# Patient Record
Sex: FEMALE | Race: OTHER | NOT HISPANIC OR LATINO
[De-identification: names, ages, dates, MRNs, and addresses within clinical notes are randomized per-mention and may not be internally consistent; named-entity substitution may affect disease eponyms.]

---

## 2023-01-30 PROBLEM — Z00.00 ENCOUNTER FOR PREVENTIVE HEALTH EXAMINATION: Status: ACTIVE | Noted: 2023-01-30

## 2023-02-14 ENCOUNTER — RESULT REVIEW (OUTPATIENT)
Age: 63
End: 2023-02-14

## 2023-02-14 ENCOUNTER — OUTPATIENT (OUTPATIENT)
Dept: OUTPATIENT SERVICES | Facility: HOSPITAL | Age: 63
LOS: 1 days | End: 2023-02-14
Payer: COMMERCIAL

## 2023-02-14 ENCOUNTER — APPOINTMENT (OUTPATIENT)
Dept: ORTHOPEDIC SURGERY | Facility: CLINIC | Age: 63
End: 2023-02-14
Payer: MEDICAID

## 2023-02-14 VITALS
HEIGHT: 62 IN | HEART RATE: 70 BPM | SYSTOLIC BLOOD PRESSURE: 147 MMHG | WEIGHT: 170 LBS | BODY MASS INDEX: 31.28 KG/M2 | DIASTOLIC BLOOD PRESSURE: 83 MMHG | OXYGEN SATURATION: 97 %

## 2023-02-14 DIAGNOSIS — Z86.79 PERSONAL HISTORY OF OTHER DISEASES OF THE CIRCULATORY SYSTEM: ICD-10-CM

## 2023-02-14 DIAGNOSIS — Z86.39 PERSONAL HISTORY OF OTHER ENDOCRINE, NUTRITIONAL AND METABOLIC DISEASE: ICD-10-CM

## 2023-02-14 DIAGNOSIS — Z78.9 OTHER SPECIFIED HEALTH STATUS: ICD-10-CM

## 2023-02-14 PROCEDURE — 72170 X-RAY EXAM OF PELVIS: CPT

## 2023-02-14 PROCEDURE — 72170 X-RAY EXAM OF PELVIS: CPT | Mod: 26

## 2023-02-14 PROCEDURE — 20610 DRAIN/INJ JOINT/BURSA W/O US: CPT | Mod: LT

## 2023-02-14 PROCEDURE — 73564 X-RAY EXAM KNEE 4 OR MORE: CPT | Mod: 26,50

## 2023-02-14 PROCEDURE — 99204 OFFICE O/P NEW MOD 45 MIN: CPT | Mod: 25

## 2023-02-14 PROCEDURE — 73564 X-RAY EXAM KNEE 4 OR MORE: CPT

## 2023-02-14 RX ORDER — MELOXICAM 7.5 MG/1
7.5 TABLET ORAL DAILY
Qty: 30 | Refills: 2 | Status: ACTIVE | COMMUNITY
Start: 2023-02-14 | End: 1900-01-01

## 2023-02-14 RX ORDER — METFORMIN HYDROCHLORIDE 625 MG/1
TABLET ORAL
Refills: 0 | Status: ACTIVE | COMMUNITY

## 2023-02-14 RX ORDER — TAMSULOSIN HYDROCHLORIDE 0.4 MG/1
CAPSULE ORAL
Refills: 0 | Status: ACTIVE | COMMUNITY

## 2023-02-14 RX ORDER — OMEPRAZOLE 40 MG/1
CAPSULE, DELAYED RELEASE ORAL
Refills: 0 | Status: ACTIVE | COMMUNITY

## 2023-02-14 RX ORDER — ATORVASTATIN CALCIUM 40 MG/1
40 TABLET, FILM COATED ORAL
Refills: 0 | Status: ACTIVE | COMMUNITY

## 2023-02-14 RX ORDER — LEVOTHYROXINE SODIUM 0.07 MG/1
75 TABLET ORAL
Refills: 0 | Status: ACTIVE | COMMUNITY

## 2023-02-14 NOTE — PHYSICAL EXAM
[de-identified] :  General appearance: well nourished and hydrated, pleasant, alert and oriented x 3, cooperative.  \par HEENT: normocephalic, EOM intact, wearing mask, external auditory canal clear.  \par Cardiovascular: no lower leg edema, no varicosities, dorsalis pedis pulses palpable and symmetric.  \par Lymphatics: no palpable lymphadenopathy, no lymphedema.  \par Neurologic: sensation is normal, no muscle weakness in upper or lower extremities, patella tendon reflexes present and symmetric.  \par Dermatologic: skin moist, warm, no rash.  \par Spine: cervical spine with normal lordosis and painless range of motion, thoracic spine with normal kyphosis and painless range of motion, lumbosacral spine with normal lordosis and painless range of motion.  No tenderness to palpation along midline spine and paraspinal musculature.  Sacroiliac joints nontender bilaterally. Negative SLR and crossed SLR tests bilaterally.\par Gait: bilaterally cautious, no assistive device\par \par Left knee: \par - Focal soft tissue swelling: none\par - Ecchymosis: none\par - Erythema: none\par - Effusion: trace, no Baker's cyst\par - Wounds: none\par - Alignment: mild constitutional varus\par - Tenderness: mild medial joint line TTP\par - ROM: 3-130 with pain on terminal flexion\par - Collateral laxity: none\par - Cruciate laxity: none\par - Popliteal angle (degrees): 20\par - Quad strength: 5/5\par \par Right knee:\par - Focal soft tissue swelling: none\par - Ecchymosis: none\par - Erythema: none\par - Effusion: trace, no Baker's cyst\par - Wounds: none\par - Alignment: mild varus\par - Tenderness: medial and lateral joint line TTP\par - ROM: 0-100, limited by pain and apprehension\par - Collateral laxity: none\par - Cruciate laxity: none\par - Popliteal angle (degrees): 15\par - Quad strength: 5/5 [de-identified] : AP pelvis and 4 views of the bilateral knees (weightbearing AP, weightbearing Quintero, weightbearing lateral, and Sunrise) were interpreted by me and reviewed with the patient.\par \par Location of imaging: [facility] \par Date of exam: 02/14/2023\par \par Pelvic alignment: normal\par \par Right hip -- \par Arthritis: none\par Deformity: none \par Osteonecrosis: none\par \par Left hip -- \par Arthritis: none\par Deformity: none \par Osteonecrosis: none \par \par Right knee -- \par Alignment: mild varus\par Arthritis: tricompartmental OA most pronounced medially, KL 3\par Patellar height: normal\par Patellar tracking: central\par \par Left knee -- \par Alignment: mild varus\par Arthritis: tricompartmental OA most pronounced medially, KL 2-3\par Patellar height: normal\par Patellar tracking: central

## 2023-02-14 NOTE — DISCUSSION/SUMMARY
[de-identified] : 61 y/o female with right greater than left knee osteoarthritis. \par - We will continue with conservative measures at this time including PT, HEP, Tylenol and meloxicam as necessary. She will receive b/l CSI today. We reviewed the risk of post procedure hyperglycemia which she understands. \par - She will f/u in 3 months with no new XR needed. I informed her that if she does develop clinical and radiographic progression of particularly the right knee osteoarthritis over time, then we may discuss total knee arthroplasty in the future, though this is not something that she was interested in discussing today.

## 2023-02-14 NOTE — HISTORY OF PRESENT ILLNESS
[5] : a current pain level of 5/10 [Bending] : worsened by bending [Walking] : worsened by walking [Heat] : relieved by heat [Ice] : relieved by ice [Rest] : relieved by rest [de-identified] : 02/14/2023: 61 y/o Mongolian speaking female presenting with daughter for evaluation of right worse than left knee pain which has been persistent for several years, also with secondary complaint of low back pain and radiating bilateral leg pain to the toes, which is more recent. She notes difficulty with most functional motions including particularly walking up and down stairs. She notes diffuse circumferential knee pain, and subjective stiffness. Treatments thus far have included PT, most recently in October 2022, as well as Aleve and multiple rounds of CSI most recently administered to bilateral knees on January 14, 2022. The relief afforded by the CSI has decreased somewhat over the years. She is not currently participating in any PT. She has no history of any relevant knee surgeries.\par \par PMHx is significant for HTN, HLD, and DM which she states is under good control. She lives with her daughter and her daughter's family in a second story elevator accessible building.  [de-identified] : pt states pain is sharp , achy and burning

## 2023-02-14 NOTE — PROCEDURE
[de-identified] : Procedure: Knee joint injection\par Laterality: bilateral knees\par Indication: Osteoarthritis - discussed with patient\par Skin prep: alcohol and chlorhexidine\par Anesthesia: ethyl chloride spray\par Needle: 20-gauge\par Portal: inferolateral\par Aspiration: none\par Injectate: 2cc of 2% lidocaine, 2cc of 0.5% bupivacaine, and 1cc of 40mg/mL triamcinolone\par Dressing: Band-aid\par Complications: None

## 2023-02-14 NOTE — END OF VISIT
[FreeTextEntry3] : All medical record entries made by the Scribe were at my, Dr. Georgi Schwartz, direction and personally dictated by me on 02/14/2023. I have reviewed the chart and agree that the record accurately reflects my personal performance of the history, physical exam, assessment and plan. I have also personally directed, reviewed, and agreed with the chart.

## 2023-02-14 NOTE — REVIEW OF SYSTEMS
[Joint Pain] : joint pain [Joint Stiffness] : joint stiffness [Joint Swelling] : joint swelling [Chills] : chills [Cough] : cough [Urinary Frequency] : urinary frequency [Urinary Urgency] : urinary urgency [Incontinence] : incontinence [Sleep Disturbances] : ~T sleep disturbances [Muscle Weakness] : muscle weakness [Negative] : Heme/Lymph

## 2023-02-14 NOTE — ADDENDUM
[FreeTextEntry1] : Documented by Shahana Be acting as a scribe for Dr. Georgi Schwartz on 02/14/2023.

## 2023-02-28 ENCOUNTER — APPOINTMENT (OUTPATIENT)
Dept: PHYSICAL MEDICINE AND REHAB | Facility: CLINIC | Age: 63
End: 2023-02-28
Payer: MEDICAID

## 2023-02-28 ENCOUNTER — RESULT REVIEW (OUTPATIENT)
Age: 63
End: 2023-02-28

## 2023-02-28 ENCOUNTER — OUTPATIENT (OUTPATIENT)
Dept: OUTPATIENT SERVICES | Facility: HOSPITAL | Age: 63
LOS: 1 days | End: 2023-02-28
Payer: COMMERCIAL

## 2023-02-28 VITALS — WEIGHT: 170 LBS | OXYGEN SATURATION: 96 % | BODY MASS INDEX: 31.28 KG/M2 | HEIGHT: 62 IN | HEART RATE: 67 BPM

## 2023-02-28 DIAGNOSIS — R20.2 PARESTHESIA OF SKIN: ICD-10-CM

## 2023-02-28 DIAGNOSIS — E03.9 HYPOTHYROIDISM, UNSPECIFIED: ICD-10-CM

## 2023-02-28 PROCEDURE — 99205 OFFICE O/P NEW HI 60 MIN: CPT

## 2023-02-28 PROCEDURE — 72114 X-RAY EXAM L-S SPINE BENDING: CPT | Mod: 26

## 2023-02-28 PROCEDURE — 72220 X-RAY EXAM SACRUM TAILBONE: CPT | Mod: 26

## 2023-02-28 PROCEDURE — 72220 X-RAY EXAM SACRUM TAILBONE: CPT

## 2023-02-28 PROCEDURE — 72114 X-RAY EXAM L-S SPINE BENDING: CPT

## 2023-02-28 RX ORDER — GABAPENTIN 100 MG/1
100 CAPSULE ORAL
Qty: 90 | Refills: 1 | Status: ACTIVE | COMMUNITY
Start: 2023-02-28 | End: 1900-01-01

## 2023-02-28 NOTE — DATA REVIEWED
[Other: ___] : [unfilled] [FreeTextEntry1] : Urine microalbumin 59.60 mg/dL\par Urine creatine 119.61 mg/dL\par Urine microalb/creat 498ug/mg (H)

## 2023-02-28 NOTE — ASSESSMENT
[FreeTextEntry1] :                                                       Assessment/Plan:\par \par ADDISON OROPEZA is a 62 year female with worsening low back pain here for initial consultation.\par \par Sacroiliac Joint Dysfunction, left\par Sprain of the sacroiliac joint region, left > right\par Chronic lumbar radiculopathy, L5, S1\par Spasm of the lumbar paraspinous muscles\par Chronic neuropathic pain, precedes her acute left low back pain\par Paresthesia of leg, B/L\par Gluteus medius tendinopathy, B/L\par Antalgic gait\par \par - Tiers of treatment and management of above diagnosis(es) were discussed with patient\par - Optimal diet, weight, sleep, and lifestyle management to minimize stress and maximize well being counseling provided\par - Imaging reviewed and discussed with patient\par - Reviewed previous encounter notes from 2/14/2023 Dr. HARSHAL Shcwartz (Ortho Surgery Joint Replacement)\par - Patient was advised to start a structured, targeted therapy program 2-3x/wk for 8 wks with goal toward HEP\par - Patient was educated on an appropriate home exercise program, provided with exercise recommendations, all questions answered\par - Patient may trial acetaminophen 1000mg up to TID PRN moderate to severe pain and to decrease average consumption of NSAIDs\par - Patient was advised to start gabapentin for their neuropathic pain symptoms. Patient was instructed to begin with 100mg at bedtime for the next week. If well tolerated, patient will double their nightly dose to 200mg at bedtime. After another week, if the medication is well tolerated, they will commence 300mg at bedtime. Patient provided with written instructions as well. All questions were answered and the patient displayed a clear understanding of the plan of care, including titration of gabapentin. The patient was informed about not taking this medication at the same time as any sleeping or muscle relaxant pills. Pt was also notified to stop, and contact our office, if it is too sedating, ankle swelling occurs and/or they experience suicidal thinking.\par - Patient advised to repeat their recent blood testing including CMP, ESR, CRP, Creatine kinase (on statin therapy), noted previously abnormal urine studies\par - Patient was advised to apply cool compresses or warm heat to affected regions PRN\par - Radiographs of lumbosacral region, sacrum ordered today \par \par - Educated about red flag symptoms including (but not limited to) new, worsened, or persistent: fever greater than 100F, bowel or bladder incontinence, bowel obstipation, inability to void urine, urinary leakage, Severe nausea or vomiting, Worsening numbness, worsening tingling/paresthesias, and/or new or progressive motor weakness; advised to seek immediate medical attention at his nearest Emergency department should they experience any of the above\par \par - Patient relates having minimal interest in locally directed treatment of their condition at this time, they were counseled on the role for local treatment as part of the tiers of treatment for their condition, all questions answered\par \par - Follow up in 2-3 weeks after imaging, in person in 2 months to assess their progress, consider lumbar MRI\par \par I have personally spent a total of at least 65 minutes preparing, reviewing internal and external records, explaining, counseling, and coordinating care for this patient encounter.\par \par Thank you,  for allowing me to participate in the care of your patient. Please do not hesitate to contact me with questions/concerns.\par \par Tauurs Correa M.D.\par Sports and Interventional Spine\[facility] Department of Physical Medicine and Rehabilitation \par [facility] \par Email: [email or web address] \par [facility]\par [facility] [place] [facility] [address]\par [facility], 11th Floor\par [place] [id number]\[facility] \[facility] Appointments: [phone]\[facility] Fax: [phone]\[facility]

## 2023-02-28 NOTE — HISTORY OF PRESENT ILLNESS
[FreeTextEntry1] : Taurus Correa M.D.\par Sports Medicine and Interventional Spine\par Department of Physical Medicine and Rehabilitation \par [facility] \par Email: [email or web address] <[email or web address]>\par \par [facility]\par [facility] [place] [facility]\par [address]\par [place] [address] [address], [address]th Floor\[place] [address]\par \par [facility]\par [facility] [place] at [facility]\par [address], 4th Floor\[place] [address]\par \par [facility] \par [facility]\par [address], 6th Floor\[place] [address]\par \par [facility] at [facility]\par [address] [facility]\[place] [address]\par \par [facility] [place] \par [facility]\par [address] [facility]\[place] [address]\par \par For [place] Appointments\par Phone: [phone]\par Fax: [phone]\par \par For [place] Appointments\par Phone: [phone]\par Fax: [phone]\par \par \par ----------------------------------------------------------------------------------------------------------------------------------------\par \par PATIENT: ADDISON OROPEZA \par MRN: [id number] \par YOB: 1960 \par DATE OF VISIT: 02/28/2023 \par Referred by Patient Refused\par PCP ADDRESS:\par \par Feb 28, 2023 \par \par Dear  \par \par Thank you for referring ADDISON OROPEZA to my Sports and Interventional Spine practice and office. Enclosed is a copy of the patient's consultation/progress note, which includes my complete assessment and recent studies completed during the patient's evaluation.\par \par If you have questions or have any patients who require nonsurgical, non-opiate management of any sports, spine, or musculoskeletal conditions, please do not hesitate to contact my , Mavis Falcon at [phone].\par \par I look forward to taking care of your patients along with you.\par \par Sincerely,\par \par Taurus\par \par Taurus Correa MD\par Sports, Interventional Spine, & Regenerative Musculoskeletal Medicine\par [facility] [place] at [facility]\par Email: [email or web address]\par \par \par                                                   Initial Consultation:\par CC: back pain x 5-6 months\par \par HPI:  This is the first visit to [facility] [place] [facility].  \par \par ADDISON OROPEZA presents with the chief complaint as above.  \par \par Initial Hx on 02/28/2023 :\par Presents in person to [place] with daughter Jaylon Alvarenga speaker\par patient reports persistent low back pain for thepast 6 months without inciting even\par patient points to the low back, left SIJ\par getting up from a sitting position produces pain over the left low back , left SIJ\par some leg stiffness over the B/L lower limbs\par The patient’s difficulties began 6 months ago\par The pain is graded as moderate pain, 5-6/10\par The pain is described as throbbing, burning sensation over the BLE in an L5 distribution\par The pain is intermittent\par The pain radiates in the left LOWER limbs in a S1 distribution into the gluteal region, also L5 as above\par The patient feels that the pain is overall persistent\par Patient denies other recent fall, MVA, injury, trauma, or accident besides presenting history above\par \par Aggravating: getting out of bed, ambulation, prolonged sitting, prolonged standing, navigating stairs, sit to stand transitional movements\par Alleviating: rest, activity modification, avoiding low seats, pharmacologic treatments\par \par Meds: denies regular PO pain medications\par tylenol 500mg 1-x per day recently\par meloxicam 15mg PRN (less than 1-2 x per week)\par diclofenac 75mg BID PRN advised to stop due to elevated urine creatine studies\par \par Therapy Program: no recent structured targeted therapy program\par HEP: doing HEP regularly\par \par Assoc Sx:\par Reports intermittent burning paresthesia in the B/L LOWER limbs in a L5 distribution\par Otherwise denies numbness, Tingling\par Denies Focal motor weakness in the upper or lower limbs\par Denies New or worsened bowel or bladder incontinence\par Denies Saddle anesthesia\par Denies Using Orthotic(s)/Supportive devices\par Denies Swelling in the upper/lower extremities\par They also deny frequent tripping, falling\par \par ROS: A 14 point review of systems was completed. Positive findings are pain as described above. The remaining systems negative.\par \par Patient is under treatment with Urology due to urge incontinence\par Breast Cancer Surveillance: up to date\par COVID HX: reviewed\par \par Assoc Hx:\par Ambulates without assistive device\par Injection Hx: denies locally directed treatment to the area in question\par Imaging Hx: reviewed\par \par Level of functioning: indep with ambulation, indep with ADLs\par Living Situation: elevator accessible apartment dwelling with steps to enter

## 2023-02-28 NOTE — PHYSICAL EXAM
[FreeTextEntry1] : Gen: A+O x 3 in NAD\par Psych: Normal mood and affect. Responds appropriately to commands\par Eyes: Anicteric. No discharge. EOMI.\par Resp: Breathing unlabored\par CV: DP pulses 2+ and equal. No varicosities noted\par Ext: No c/c/e\par Skin: No lesions noted\par \par Gait:\par +antalgic \par +  reciprocating heel to toe\par able to stand on toes and heels WITH hand holding\par Tandem gait intact WITH hand holding\par Poor single leg standing balance B/L\par Romberg negative\par \par Trendelenburg present with L>R stance leg\par \par Inspection: Spine alignment is midline.\par Palpation: There is + tenderness over the midline spinous processes, paravertebral muscles of the thoracolumbar region\par Lumbar ROM: Flexion, extension, side-bending, rotation, markedly limited in most planes\par +pain with lateral flexion\par +pain with oblique extension\par +pain with lateral rotation \par \par Hip ROM: neg pain at terminal ROM bilaterally.\par FAIR, FABERE negative bilaterally.\par \par 	Hip Flex       Knee Ext      Ankle Dorsi           EHL        Ankle Plantar\par Right	4/5	        5-/5	                 5/5	          4/5	             5/5                           \par Left	4/5	        4=/5	                 5/5	          3+/5             5/5                           \par \par Hip abduction R 4+/5 L 4-/5\par Hip adduction R 4+/5 L 4-/5\par Hip extension R 4+/5 L 4-/5\par Knee Flexion R 4+/5 L 4-/5\par \par Tone: Normal. No clonus.\par Sensation: Grossly intact to light touch bilateral lower limbs.\par Proprioception: Intact at big toes bilaterally.\par Reflexes: 2+ symmetric knee jerk, ankle jerk.\par \par Plantars downgoing bilaterally.\par SLR negative bilaterally\par Crossed SLR negative bilaterally.\par Slump Test negative bilaterally\par \par prone gapping test + left SIJ\par yeoman + left SIJ\par nachlas +  left\par active hip extension was more difficult on the left\par + zane on two attempts\par + Stork, left

## 2023-03-07 ENCOUNTER — APPOINTMENT (OUTPATIENT)
Dept: PHYSICAL MEDICINE AND REHAB | Facility: CLINIC | Age: 63
End: 2023-03-07
Payer: MEDICAID

## 2023-03-07 DIAGNOSIS — M53.3 SACROCOCCYGEAL DISORDERS, NOT ELSEWHERE CLASSIFIED: ICD-10-CM

## 2023-03-07 DIAGNOSIS — M47.816 SPONDYLOSIS WITHOUT MYELOPATHY OR RADICULOPATHY, LUMBAR REGION: ICD-10-CM

## 2023-03-07 DIAGNOSIS — M19.09 PRIMARY OSTEOARTHRITIS, OTHER SPECIFIED SITE: ICD-10-CM

## 2023-03-07 DIAGNOSIS — M43.16 SPONDYLOLISTHESIS, LUMBAR REGION: ICD-10-CM

## 2023-03-07 DIAGNOSIS — M41.9 SCOLIOSIS, UNSPECIFIED: ICD-10-CM

## 2023-03-07 DIAGNOSIS — M47.817 SPONDYLOSIS WITHOUT MYELOPATHY OR RADICULOPATHY, LUMBOSACRAL REGION: ICD-10-CM

## 2023-03-07 DIAGNOSIS — M54.16 RADICULOPATHY, LUMBAR REGION: ICD-10-CM

## 2023-03-07 DIAGNOSIS — M43.17 SPONDYLOLISTHESIS, LUMBOSACRAL REGION: ICD-10-CM

## 2023-03-07 DIAGNOSIS — M67.959 UNSPECIFIED DISORDER OF SYNOVIUM AND TENDON, UNSPECIFIED THIGH: ICD-10-CM

## 2023-03-07 DIAGNOSIS — M79.2 NEURALGIA AND NEURITIS, UNSPECIFIED: ICD-10-CM

## 2023-03-07 DIAGNOSIS — M62.830 MUSCLE SPASM OF BACK: ICD-10-CM

## 2023-03-07 DIAGNOSIS — M51.36 OTHER INTERVERTEBRAL DISC DEGENERATION, LUMBAR REGION: ICD-10-CM

## 2023-03-07 DIAGNOSIS — R26.89 OTHER ABNORMALITIES OF GAIT AND MOBILITY: ICD-10-CM

## 2023-03-07 DIAGNOSIS — S33.6XXA SPRAIN OF SACROILIAC JOINT, INITIAL ENCOUNTER: ICD-10-CM

## 2023-03-07 DIAGNOSIS — G89.29 NEURALGIA AND NEURITIS, UNSPECIFIED: ICD-10-CM

## 2023-03-07 DIAGNOSIS — M51.34 OTHER INTERVERTEBRAL DISC DEGENERATION, THORACIC REGION: ICD-10-CM

## 2023-03-07 PROCEDURE — 99443: CPT

## 2023-04-25 ENCOUNTER — APPOINTMENT (OUTPATIENT)
Dept: PHYSICAL MEDICINE AND REHAB | Facility: CLINIC | Age: 63
End: 2023-04-25

## 2023-05-16 ENCOUNTER — APPOINTMENT (OUTPATIENT)
Dept: ORTHOPEDIC SURGERY | Facility: CLINIC | Age: 63
End: 2023-05-16

## 2024-01-22 ENCOUNTER — APPOINTMENT (OUTPATIENT)
Dept: ORTHOPEDIC SURGERY | Facility: CLINIC | Age: 64
End: 2024-01-22

## 2024-01-24 ENCOUNTER — OUTPATIENT (OUTPATIENT)
Dept: OUTPATIENT SERVICES | Facility: HOSPITAL | Age: 64
LOS: 1 days | End: 2024-01-24
Payer: MEDICARE

## 2024-01-24 ENCOUNTER — APPOINTMENT (OUTPATIENT)
Dept: ORTHOPEDIC SURGERY | Facility: CLINIC | Age: 64
End: 2024-01-24
Payer: MEDICARE

## 2024-01-24 ENCOUNTER — RESULT REVIEW (OUTPATIENT)
Age: 64
End: 2024-01-24

## 2024-01-24 VITALS
HEIGHT: 62 IN | BODY MASS INDEX: 31.28 KG/M2 | DIASTOLIC BLOOD PRESSURE: 62 MMHG | WEIGHT: 170 LBS | HEART RATE: 74 BPM | SYSTOLIC BLOOD PRESSURE: 141 MMHG | OXYGEN SATURATION: 94 %

## 2024-01-24 DIAGNOSIS — R29.6 REPEATED FALLS: ICD-10-CM

## 2024-01-24 DIAGNOSIS — M17.0 BILATERAL PRIMARY OSTEOARTHRITIS OF KNEE: ICD-10-CM

## 2024-01-24 PROCEDURE — 73564 X-RAY EXAM KNEE 4 OR MORE: CPT | Mod: 26,50

## 2024-01-24 PROCEDURE — 20610 DRAIN/INJ JOINT/BURSA W/O US: CPT | Mod: 50

## 2024-01-24 PROCEDURE — 73564 X-RAY EXAM KNEE 4 OR MORE: CPT

## 2024-01-26 NOTE — PROCEDURE
[de-identified] : 63-year-old female following up for right greater than left knee osteoarthritis. She received good benefits from bilateral cortisone injections in February of 2023 and would like to repeat these today. She does, however, concerningly report the progressive incidence of multiple falls. She reports that within the last three months, she's fallen three times. She reports that none of these episodes appear to have been related to palpitations or blackout episodes. She reports that in general, she is performing some housework and then she finds herself on the floor. This does not appear to be secondary to a specific knee or leg weakness issue nor of buckling episodes. They have raised this subject with their primary care doctor who does not appear to have rendered any diagnostics or treatments. She has not been following up with any other care providers.   Imaging findings today. Bilateral knee x-rays were repeated today, 1/24/2024, at [facility]. These demonstrate for the right knee varus alignment with mild lateral tibial subluxation, tricompartmental osteoarthritis, most pronounced medially with bone-on-bone articulation, Kellgren and Winston 4. The patella sits relatively baja and tracks centrally. The left knee demonstrates varus alignment, tricompartmental osteoarthritis, most pronounced medially, Kellgren and Winston 3. The patella sits baja and tracks centrally.  Procedure: Knee joint injection Laterality:  BILATERAL Indication: Osteoarthritis - discussed with patient Skin prep: alcohol and chlorhexidine Anesthesia: ethyl chloride spray Needle: 20-gauge Portal: inferolateral Aspiration: none Injectate: 2cc of 2% lidocaine, 2cc of 0.5% bupivacaine, and 1cc of 40mg/mL triamcinolone Dressing: Band-aid Complications: None   - We will reorder outpatient physical therapy to improve her quadriceps strength and gait stability. We provided her with a home exercise program for the same.  - I recommended that she return to see Dr. Hopson to evaluate for any worsening of lumbar spinal disease and we also provided her with a referral to neurology to assess for any central nervous system disorders that could be contributing to her instability and multiple falls.  - I informed her that her right knee osteoarthritis at this time is considered to be end-stage bone-on-bone. While I do think that it's reasonable to continue with serial injections for as long as they remain effective, if these injections lose efficacy over time then she would be a candidate to consider right total knee arthroplasty, though we should get clarity and resolution of her falls prior to considering any surgical intervention.

## 2024-01-26 NOTE — PROCEDURE
[de-identified] : 63-year-old female following up for right greater than left knee osteoarthritis. She received good benefits from bilateral cortisone injections in February of 2023 and would like to repeat these today. She does, however, concerningly report the progressive incidence of multiple falls. She reports that within the last three months, she's fallen three times. She reports that none of these episodes appear to have been related to palpitations or blackout episodes. She reports that in general, she is performing some housework and then she finds herself on the floor. This does not appear to be secondary to a specific knee or leg weakness issue nor of buckling episodes. They have raised this subject with their primary care doctor who does not appear to have rendered any diagnostics or treatments. She has not been following up with any other care providers.   Imaging findings today. Bilateral knee x-rays were repeated today, 1/24/2024, at [facility]. These demonstrate for the right knee varus alignment with mild lateral tibial subluxation, tricompartmental osteoarthritis, most pronounced medially with bone-on-bone articulation, Kellgren and Winston 4. The patella sits relatively baja and tracks centrally. The left knee demonstrates varus alignment, tricompartmental osteoarthritis, most pronounced medially, Kellgren and Winston 3. The patella sits baja and tracks centrally.  Procedure: Knee joint injection Laterality:  BILATERAL Indication: Osteoarthritis - discussed with patient Skin prep: alcohol and chlorhexidine Anesthesia: ethyl chloride spray Needle: 20-gauge Portal: inferolateral Aspiration: none Injectate: 2cc of 2% lidocaine, 2cc of 0.5% bupivacaine, and 1cc of 40mg/mL triamcinolone Dressing: Band-aid Complications: None   - We will reorder outpatient physical therapy to improve her quadriceps strength and gait stability. We provided her with a home exercise program for the same.  - I recommended that she return to see Dr. Hopson to evaluate for any worsening of lumbar spinal disease and we also provided her with a referral to neurology to assess for any central nervous system disorders that could be contributing to her instability and multiple falls.  - I informed her that her right knee osteoarthritis at this time is considered to be end-stage bone-on-bone. While I do think that it's reasonable to continue with serial injections for as long as they remain effective, if these injections lose efficacy over time then she would be a candidate to consider right total knee arthroplasty, though we should get clarity and resolution of her falls prior to considering any surgical intervention.

## 2024-01-26 NOTE — END OF VISIT
[FreeTextEntry3] : All medical record entries made by the Scribe were at my, Dr. Georgi Schwartz, direction and personally dictated by me on 01/24/2024. I have reviewed the chart and agree that the record accurately reflects my personal performance of the history, physical exam, assessment and plan. I have also personally directed, reviewed, and agreed with the chart.

## 2025-01-21 ENCOUNTER — APPOINTMENT (OUTPATIENT)
Dept: ORTHOPEDIC SURGERY | Facility: CLINIC | Age: 65
End: 2025-01-21
Payer: MEDICARE

## 2025-01-21 ENCOUNTER — RESULT REVIEW (OUTPATIENT)
Age: 65
End: 2025-01-21

## 2025-01-21 ENCOUNTER — NON-APPOINTMENT (OUTPATIENT)
Age: 65
End: 2025-01-21

## 2025-01-21 ENCOUNTER — OUTPATIENT (OUTPATIENT)
Dept: OUTPATIENT SERVICES | Facility: HOSPITAL | Age: 65
LOS: 1 days | End: 2025-01-21
Payer: MEDICARE

## 2025-01-21 DIAGNOSIS — M17.0 BILATERAL PRIMARY OSTEOARTHRITIS OF KNEE: ICD-10-CM

## 2025-01-21 PROCEDURE — 73564 X-RAY EXAM KNEE 4 OR MORE: CPT | Mod: 26,50

## 2025-01-21 PROCEDURE — 99213 OFFICE O/P EST LOW 20 MIN: CPT

## 2025-01-21 PROCEDURE — 73564 X-RAY EXAM KNEE 4 OR MORE: CPT

## 2025-02-12 ENCOUNTER — APPOINTMENT (OUTPATIENT)
Dept: ORTHOPEDIC SURGERY | Facility: CLINIC | Age: 65
End: 2025-02-12
Payer: MEDICARE

## 2025-02-12 VITALS
BODY MASS INDEX: 31.28 KG/M2 | OXYGEN SATURATION: 99 % | DIASTOLIC BLOOD PRESSURE: 75 MMHG | HEART RATE: 61 BPM | HEIGHT: 62 IN | SYSTOLIC BLOOD PRESSURE: 115 MMHG | WEIGHT: 170 LBS

## 2025-02-12 DIAGNOSIS — M17.0 BILATERAL PRIMARY OSTEOARTHRITIS OF KNEE: ICD-10-CM

## 2025-02-12 PROCEDURE — 20610 DRAIN/INJ JOINT/BURSA W/O US: CPT | Mod: 50
